# Patient Record
Sex: FEMALE | URBAN - METROPOLITAN AREA
[De-identification: names, ages, dates, MRNs, and addresses within clinical notes are randomized per-mention and may not be internally consistent; named-entity substitution may affect disease eponyms.]

---

## 2019-03-14 ENCOUNTER — APPOINTMENT (RX ONLY)
Dept: URBAN - METROPOLITAN AREA OTHER 12 | Facility: OTHER | Age: 29
Setting detail: DERMATOLOGY
End: 2019-03-14

## 2019-03-14 DIAGNOSIS — D22 MELANOCYTIC NEVI: ICD-10-CM

## 2019-03-14 DIAGNOSIS — L91.0 HYPERTROPHIC SCAR: ICD-10-CM

## 2019-03-14 PROBLEM — J45.909 UNSPECIFIED ASTHMA, UNCOMPLICATED: Status: ACTIVE | Noted: 2019-03-14

## 2019-03-14 PROBLEM — D22.122 MELANOCYTIC NEVI OF LEFT LOWER EYELID, INCLUDING CANTHUS: Status: ACTIVE | Noted: 2019-03-14

## 2019-03-14 PROCEDURE — 99202 OFFICE O/P NEW SF 15 MIN: CPT | Mod: 25

## 2019-03-14 PROCEDURE — ? COUNSELING

## 2019-03-14 PROCEDURE — ? INTRALESIONAL KENALOG

## 2019-03-14 PROCEDURE — 11900 INJECT SKIN LESIONS </W 7: CPT

## 2019-03-14 ASSESSMENT — LOCATION DETAILED DESCRIPTION DERM
LOCATION DETAILED: RIGHT SUPERIOR CRUS OF ANTIHELIX
LOCATION DETAILED: LEFT LATERAL CANTHUS

## 2019-03-14 ASSESSMENT — LOCATION ZONE DERM
LOCATION ZONE: EYELID
LOCATION ZONE: EAR

## 2019-03-14 ASSESSMENT — LOCATION SIMPLE DESCRIPTION DERM
LOCATION SIMPLE: RIGHT EAR
LOCATION SIMPLE: LEFT EYELID

## 2019-03-14 NOTE — PROCEDURE: INTRALESIONAL KENALOG
Treatment Number (Optional): 1
Consent: The risks of atrophy were reviewed with the patient.
X Size Of Lesion In Cm (Optional): 0
Lot # For Kenalog (Optional): [id number]
Expiration Date For Kenalog (Optional): April 2020
Medical Necessity Clause: This procedure was medically necessary because the lesions that were treated were:
Kenalog Preparation: Kenalog
Total Volume (Ccs): 0.25
Concentration Of Kenalog Solution Injected (Mg/Ml): 40.0
Detail Level: Detailed
Administered By (Optional): Dr. Anderson
Include Z78.9 (Other Specified Conditions Influencing Health Status) As An Associated Diagnosis?: No

## 2019-07-29 ENCOUNTER — APPOINTMENT (RX ONLY)
Dept: URBAN - METROPOLITAN AREA OTHER 12 | Facility: OTHER | Age: 29
Setting detail: DERMATOLOGY
End: 2019-07-29

## 2019-07-29 DIAGNOSIS — L91.0 HYPERTROPHIC SCAR: ICD-10-CM

## 2019-07-29 PROCEDURE — ? INJECTION

## 2019-07-29 PROCEDURE — 11900 INJECT SKIN LESIONS </W 7: CPT

## 2019-07-29 ASSESSMENT — LOCATION SIMPLE DESCRIPTION DERM: LOCATION SIMPLE: RIGHT EAR

## 2019-07-29 ASSESSMENT — LOCATION DETAILED DESCRIPTION DERM: LOCATION DETAILED: RIGHT SUPERIOR HELIX

## 2019-07-29 ASSESSMENT — LOCATION ZONE DERM: LOCATION ZONE: EAR

## 2019-07-29 NOTE — PROCEDURE: INJECTION
Post-Care Instructions: I reviewed with the patient in detail post-care instructions. Patient understands to keep the injection sites clean and call the clinic if there is any redness, swelling or pain.
Total Volume Injected In Cc (Will Not Affected Billing): 0.4
Route: IL
Expiration Date (Optional): 09/2020
Bill J-Code: no
Treatment Number: 0
Procedure Information: Please note that the numeric value listed in the Medication (1) and associated J-code units and Medication (2) and associated J-code units variables are j-code amounts and do not represent either the concentration or the total amount of the medications injected.  I strongly recommend selecting no to the Render J-code information in note question. This will allow your note to be more clear. If you are billing j-codes with your injection codes you need to document the total amount of the medication injected. This amount should match the j-code units. For example, if you are injecting Triamcinolone 40mg as an intramuscular injection you would select 40 for the dose field and mg for the units. This would allow you to document  with 4 units (40mg = 10mg x 4). The total volume is not used to calculate j-codes only the amount of the medication administered.
Units: mg
Lot # (Optional): [id number]
Consent: The risks of the medication were reviewed with the patient.
Detail Level: None
Dose Administered (Numbers Only - Mg, G, Mcg, Units, Cc): 40
Medication (1) And Associated J-Code Units: Triamcinolone acetonide, 10mg

## 2019-08-26 ENCOUNTER — APPOINTMENT (RX ONLY)
Dept: URBAN - METROPOLITAN AREA OTHER 12 | Facility: OTHER | Age: 29
Setting detail: DERMATOLOGY
End: 2019-08-26

## 2019-08-26 DIAGNOSIS — Z41.9 ENCOUNTER FOR PROCEDURE FOR PURPOSES OTHER THAN REMEDYING HEALTH STATE, UNSPECIFIED: ICD-10-CM

## 2019-08-26 PROCEDURE — ? COSMETIC CONSULTATION: BODY SCULPTING

## 2019-08-26 PROCEDURE — ? PHOTO-DOCUMENTATION

## 2019-08-26 PROCEDURE — ? SCULPSURE

## 2019-08-26 PROCEDURE — ? COSMETIC QUOTE

## 2019-08-26 PROCEDURE — ? PATIENT SPECIFIC COUNSELING

## 2019-08-26 ASSESSMENT — LOCATION SIMPLE DESCRIPTION DERM
LOCATION SIMPLE: LEFT THIGH
LOCATION SIMPLE: RIGHT THIGH

## 2019-08-26 ASSESSMENT — LOCATION DETAILED DESCRIPTION DERM
LOCATION DETAILED: RIGHT ANTERIOR PROXIMAL THIGH
LOCATION DETAILED: LEFT ANTERIOR PROXIMAL THIGH

## 2019-08-26 ASSESSMENT — LOCATION ZONE DERM: LOCATION ZONE: LEG

## 2019-08-26 NOTE — PROCEDURE: SCULPSURE
resting/sleeping
comfortable appearance/cooperative
Comments (Optional): Patient weight. 117.3 lbs
Detail Level: Zone
Treatment Number: 1
Treatment Area(S): abdomen
Coolblast Used: No
Treatment Number: 0
Starting Fluence (Optional- Include Units): 1.1
Treatment Area Override: left inner thigh
Pre-Procedure Text: Written consent obtained, risks reviewed including but not limited to crusting, scabbing, blistering, scarring, darker or lighter pigmentary change, and/or incomplete removal. After consent was obtained the treatment areas were prepped and treated using the parameters noted above. See diagram for applicator placement. See flow sheet in chart for energy changes
Remainder Fluence (Optional- Include Units): 1.4
Pacs Used: 4
Consent: Written consent obtained, risks reviewed including but not limited to crusting, scabbing, blistering, scarring, darker or lighter pigmentary change, and/or incomplete removal.
Treatment Area Override: right inner thigh
Post-Procedure Text: I reviewed with the patient in detail post-care instructions. Patient to increase water intake and physical exercise. Post SculpSure form given to patient.
Pre-Treatment Images Taken: Yes
External Cooling Fan Speed: 5

## 2019-08-26 NOTE — PROCEDURE: COSMETIC QUOTE
Misc Procedure 1 Units: 0
Body Procedure 6 Price/Unit (In Dollars- Use Only Numbers And Decimals): 0.00
Use Name For Above Discounts: Discount 1
Discount 1 Name: cosmetic consultation adjustment
Apply Anesthesia Fee: No
Anesthesia Fee Units (Optional): 1
Detail Level: Zone
Body Procedure 1 Price/Unit (In Dollars- Use Only Numbers And Decimals): 2750
Send Charges To Patient Encounter: Yes
Global Amount Discount (Will Not Be Applied To Implants Or Outside Services): 195
Body Procedure 1: sculpsure 2
Quote Title (Optional- Will Act As A Header): Single Sculpsure 2 treatment post completed series
Body Procedure 1 Price/Unit (In Dollars- Use Only Numbers And Decimals): 1600.00
Body Procedure 1: sculpsure 2 single treatment session

## 2019-10-07 ENCOUNTER — APPOINTMENT (RX ONLY)
Dept: URBAN - METROPOLITAN AREA OTHER 12 | Facility: OTHER | Age: 29
Setting detail: DERMATOLOGY
End: 2019-10-07

## 2019-10-07 DIAGNOSIS — Z41.9 ENCOUNTER FOR PROCEDURE FOR PURPOSES OTHER THAN REMEDYING HEALTH STATE, UNSPECIFIED: ICD-10-CM

## 2019-10-07 PROCEDURE — ? SCULPSURE

## 2019-10-07 PROCEDURE — ? PATIENT SPECIFIC COUNSELING

## 2019-10-07 ASSESSMENT — LOCATION SIMPLE DESCRIPTION DERM
LOCATION SIMPLE: LEFT THIGH
LOCATION SIMPLE: RIGHT THIGH

## 2019-10-07 ASSESSMENT — LOCATION DETAILED DESCRIPTION DERM
LOCATION DETAILED: LEFT ANTERIOR PROXIMAL THIGH
LOCATION DETAILED: RIGHT ANTERIOR PROXIMAL THIGH

## 2019-10-07 ASSESSMENT — LOCATION ZONE DERM: LOCATION ZONE: LEG

## 2019-10-07 NOTE — PROCEDURE: SCULPSURE
Treatment Number: 0
Treatment Number: 2
Coolblast Used: No
Pacs Used: 4
Pacs Used: 1
Remainder Fluence (Optional- Include Units): 1.4
Detail Level: Zone
Starting Fluence (Optional- Include Units): 1.1
Treatment Area Override: right inner thigh
Pre-Procedure Text: Written consent obtained, risks reviewed including but not limited to crusting, scabbing, blistering, scarring, darker or lighter pigmentary change, and/or incomplete removal. After consent was obtained the treatment areas were prepped and treated using the parameters noted above. See diagram for applicator placement. See flow sheet in chart for energy changes
Post-Procedure Text: I reviewed with the patient in detail post-care instructions. Patient to increase water intake and physical exercise. Post SculpSure form given to patient.
Consent: Written consent obtained, risks reviewed including but not limited to crusting, scabbing, blistering, scarring, darker or lighter pigmentary change, and/or incomplete removal.
Treatment Area Override: left inner thigh
External Cooling Fan Speed: 5
Pre-Treatment Images Taken: Yes
Treatment Area(S): right inner thighs

## 2020-02-05 ENCOUNTER — APPOINTMENT (RX ONLY)
Dept: URBAN - METROPOLITAN AREA OTHER 11 | Facility: OTHER | Age: 30
Setting detail: DERMATOLOGY
End: 2020-02-05

## 2020-02-05 DIAGNOSIS — Z41.9 ENCOUNTER FOR PROCEDURE FOR PURPOSES OTHER THAN REMEDYING HEALTH STATE, UNSPECIFIED: ICD-10-CM

## 2020-02-05 PROCEDURE — ? PHOTO-DOCUMENTATION

## 2020-02-05 PROCEDURE — ? OTHER

## 2020-02-05 NOTE — HPI: OTHER
Condition:: Unwanted fat
Please Describe Your Condition:: Patient presents for post sculpsure photos. S/p 2/2 treatments to the inner thighs

## 2020-02-05 NOTE — PROCEDURE: OTHER
Other (Free Text): Will honor tier pricing for third treatment to the area in the future. Patient will call to schedule.\\n\\n\\n$100.00 cosmetic credit to be applied to account for post photos
Note Text (......Xxx Chief Complaint.): This diagnosis correlates with the
Detail Level: Detailed

## 2020-12-10 ENCOUNTER — APPOINTMENT (RX ONLY)
Dept: URBAN - METROPOLITAN AREA OTHER 11 | Facility: OTHER | Age: 30
Setting detail: DERMATOLOGY
End: 2020-12-10

## 2020-12-10 DIAGNOSIS — L91.0 HYPERTROPHIC SCAR: ICD-10-CM

## 2020-12-10 PROCEDURE — ? COUNSELING

## 2020-12-10 PROCEDURE — 11900 INJECT SKIN LESIONS </W 7: CPT

## 2020-12-10 PROCEDURE — ? INJECTION

## 2020-12-10 ASSESSMENT — LOCATION SIMPLE DESCRIPTION DERM: LOCATION SIMPLE: RIGHT EAR

## 2020-12-10 ASSESSMENT — LOCATION ZONE DERM: LOCATION ZONE: EAR

## 2020-12-10 ASSESSMENT — LOCATION DETAILED DESCRIPTION DERM: LOCATION DETAILED: RIGHT SUPERIOR HELIX

## 2020-12-10 NOTE — PROCEDURE: INJECTION
Procedure Information: Please note that the numeric value listed in the Medication (1) and associated J-code units and Medication (2) and associated J-code units variables are j-code amounts and do not represent either the concentration or the total amount of the medications injected.  I strongly recommend selecting no to the Render J-code information in note question. This will allow your note to be more clear. If you are billing j-codes with your injection codes you need to document the total amount of the medication injected. This amount should match the j-code units. For example, if you are injecting Triamcinolone 40mg as an intramuscular injection you would select 40 for the dose field and mg for the units. This would allow you to document  with 4 units (40mg = 10mg x 4). The total volume is not used to calculate j-codes only the amount of the medication administered.
Post-Care Instructions: I reviewed with the patient in detail post-care instructions. Patient understands to keep the injection sites clean and call the clinic if there is any redness, swelling or pain.
Medication (1) And Associated J-Code Units: Triamcinolone acetonide, 10mg
Treatment Number: 3
Bill J-Code: no
Lot # (Optional): [id number]
Dose Administered (Numbers Only - Mg, G, Mcg, Units, Cc): 0
Detail Level: None
Units: cc
Expiration Date (Optional): 04/2022
Consent: The risks of the medication were reviewed with the patient.
Total Volume Injected In Cc (Will Not Affected Billing): 0.
Route: IL
Dose Administered (Numbers Only - Mg, G, Mcg, Units, Cc): 0.2 of Kenalog 40mg/mL